# Patient Record
Sex: FEMALE | Race: WHITE
[De-identification: names, ages, dates, MRNs, and addresses within clinical notes are randomized per-mention and may not be internally consistent; named-entity substitution may affect disease eponyms.]

---

## 2017-02-10 ENCOUNTER — HOSPITAL ENCOUNTER (EMERGENCY)
Dept: HOSPITAL 39 - ER | Age: 37
Discharge: HOME | End: 2017-02-10
Payer: COMMERCIAL

## 2017-02-10 VITALS — OXYGEN SATURATION: 95 % | DIASTOLIC BLOOD PRESSURE: 87 MMHG | TEMPERATURE: 99 F | SYSTOLIC BLOOD PRESSURE: 142 MMHG

## 2017-02-10 DIAGNOSIS — Z86.14: ICD-10-CM

## 2017-02-10 DIAGNOSIS — Z87.891: ICD-10-CM

## 2017-02-10 DIAGNOSIS — J45.909: ICD-10-CM

## 2017-02-10 DIAGNOSIS — H66.90: Primary | ICD-10-CM

## 2017-02-10 NOTE — ED.PDOC
History of Present Illness





- General


Chief Complaint: Headache


Stated Complaint: Right sided headache & earache


Time Seen by Provider: 02/10/17 10:33


Source: patient, RN notes reviewed, Vital Signs reviewed


Exam Limitations: no limitations





- History of Present Illness


Initial Comments: 


Patient reports this morning at 2am she developed a right sided HA with ear pain

, dizziness and nausea. She had a similar, brief episode 5 days ago that 

resolved on its own. Denies any recent URI symptoms. No numbness, weakness, 

tingling. No visual or speech changes.


Timing/Duration: 24 hours


Quality: moderate, constant, pressure


Head Injury Location: other - Whole right side of head and jaw


Recent Head Trauma: no recent headache/trauma


Improving Factors: rest


Worsening Factors: movement


Associated Symptoms: nausea/vomiting, other - Dizziness/vertigo


Allergies/Adverse Reactions: 


Allergies





NO KNOWN ALLERGY Allergy (Unverified 07/19/15 22:17)


 








Home Medications: 


Ambulatory Orders





Fluoxetine HCl [Prozac] 60 mg PO DAILY 01/27/14 


Phenytoin Sodium Cap [Dilantin Cap] 100 mg PO TID 01/27/14 


Ondansetron [Zofran Odt] 4 mg PO Q4HR PRN #20 tab 02/10/17 











Review of Systems





- Review of Systems


Constitutional: Denies: chills, diaphoresis, fever, malaise, weakness


EENTM: States: see HPI, ear pain.  Denies: eye pain, blurred vision, double 

vision, ear discharge, nose pain, nose congestion, throat pain, throat swelling

, mouth pain, mouth swelling


Respiratory: States: no symptoms reported.  Denies: cough, short of breath


Cardiology: States: no symptoms reported.  Denies: chest pain, palpitations, 

syncope


Gastrointestinal/Abdominal: States: see HPI, nausea.  Denies: abdominal pain, 

diarrhea, vomiting


Musculoskeletal: States: no symptoms reported.  Denies: back pain, muscle pain, 

muscle stiffness


Skin: States: no symptoms reported


Neurological: States: headache.  Denies: numbness, paresthesia, pre-existing 

deficit, seizure, tingling, tremors, weakness


Endocrine: States: no symptoms reported


Hematologic/Lymphatic: States: no symptoms reported





Past Medical History (General)





- Patient Medical History


Hx Seizures: Yes


Hx Stroke: No


Hx Dementia: No


Hx Asthma: Yes


Hx of COPD: No


Hx Cardiac Disorders: No


Hx Congestive Heart Failure: No


Hx Pacemaker: No


Hx Hypertension: No


Hx Thyroid Disease: No


Hx Diabetes: No


Hx Gastroesophageal Reflux: No


Hx Renal Disease: No


Hx Cancer: No


Hx of HIV: No


Hx Hepatitis C: No


Hx MRSA: Yes


MRSA Source:: Wound





- Vaccination History


Hx Tetanus, Diphtheria Vaccination: No


Hx Influenza Vaccination: No


Hx Pneumococcal Vaccination: No





- Social History


Hx Tobacco Use: Yes


Hx Chewing Tobacco Use: No


Hx Alcohol Use: Yes


Hx Substance Use: No


Hx Substance Use Treatment: No


Hx Depression: Yes


Hx Physical Abuse: No


Hx Emotional Abuse: No


Hx Suspected Abuse: No





- Female History


Hx Last Menstrual Period: 07/01/14


Patient Pregnant: No





Family Medical History





- Family History


  ** Mother


Family History: Unknown





Physical Exam





- Physical Exam


General Appearance: Alert, No apparent distress, Ill Appearing, Unkempt, Well 

Developed, Well Hydrated, Well Nourished


Eyes, Ears, Nose, Throat Exam: PERRL/EOMI, pharynx normal, TM abnormal (R) - TM 

is erythematous and bulging


Neck: non-tender, full range of motion, supple, normal inspection, trachea 

midline


Cardiovascular/Chest: regular rate, rhythm, no edema, no gallop, no JVD, no 

murmur


Respiratory: lungs clear, normal breath sounds, no respiratory distress, no 

accessory muscle use


Extremity: normal range of motion, non-tender, normal inspection, no pedal edema


Mental Status: alert, oriented x 3


CNs Exam: normal hearing, normal speech, PERRL


Coordination/Gait: normal gait


Motor/Sensory: no motor deficit, no sensory deficit


Skin Exam: warm/dry, normal color


Lymphatic: no adenopathy





Progress





- Progress


Progress: 





02/10/17 11:35


Patient is feeling better but the pain keeps coming in waves. Discussed ear 

infection, how painful they can be, how they cause vertigo and the importance 

of gargling warm salt water and taking a good decongestant. Explained that the 

shot of Rochephin should cover the infection but need to get the ear draining 

with above measures. Also discussed the possibility of an ear drum rupture and 

the importance of following up for recheck if that dose occur.





Departure





- Departure


Clinical Impression: 


 Otitis media


Time of Disposition: 11:37


Disposition: Discharge to Home or Self Care


Condition: Good


Departure Forms:  ED Discharge - Pt. Copy, Patient Portal Self Enrollment


Instructions:  Middle Ear Infection


Diet: resume usual diet


Activity: increase activity as tolerated


Prescriptions: 


Ondansetron [Zofran Odt] 4 mg PO Q4HR PRN #20 tab


 PRN Reason: Nausea/Vomiting


Home Medications: 


Ambulatory Orders





Fluoxetine HCl [Prozac] 60 mg PO DAILY 01/27/14 


Phenytoin Sodium Cap [Dilantin Cap] 100 mg PO TID 01/27/14 


Ondansetron [Zofran Odt] 4 mg PO Q4HR PRN #20 tab 02/10/17 








Additional Instructions: 


Gargle warm salt water every 2 hours


OTC Robitussin or Mucinex as directed.

## 2017-03-07 ENCOUNTER — HOSPITAL ENCOUNTER (EMERGENCY)
Dept: HOSPITAL 39 - ER | Age: 37
Discharge: HOME | End: 2017-03-07
Payer: COMMERCIAL

## 2017-03-07 VITALS — DIASTOLIC BLOOD PRESSURE: 80 MMHG | TEMPERATURE: 98 F | SYSTOLIC BLOOD PRESSURE: 148 MMHG | OXYGEN SATURATION: 95 %

## 2017-03-07 DIAGNOSIS — J01.90: Primary | ICD-10-CM

## 2017-03-07 DIAGNOSIS — Z79.899: ICD-10-CM

## 2017-03-07 DIAGNOSIS — Z91.018: ICD-10-CM

## 2017-03-07 DIAGNOSIS — J98.9: ICD-10-CM

## 2017-03-07 NOTE — ED.PDOC
History of Present Illness





- General


Chief Complaint: General


Stated Complaint: (R) ear discomfort, fever, headache


Time Seen by Provider: 17 12:04


Source: patient


Exam Limitations: no limitations





- History of Present Illness


Initial Comments: 


She stated she started having cough non productive and nasal congestion for 6 

days,her daughter got ill first,no foreign travel no fever no chills.With dull 

frontal headache.


Timing/Duration: other - 6 days


Improving Factors: nothing


Worsening Factors: nothing


Associated Symptoms: cough, headaches, loss of appetite


Allergies/Adverse Reactions: 


Allergies





Coconut Fatty Acids Allergy (Verified 17 11:39)


 Anaphylaxis


 Causes throat and tongue to swell 








Home Medications: 


Ambulatory Orders





Fluoxetine HCl [Prozac] 60 mg PO DAILY 14 


Phenytoin Sodium Cap [Dilantin Cap] 100 mg PO TID 14 


Albuterol Inhaler [Ventolin Hfa Inhaler] 108 mcg IN Q4HR PRN #1 inh 17 


Amoxicillin [Amoxil] 1,000 mg PO BID #40 cap 17 


Benzonatate Perles [Tessalon Perles] 200 mg PO BID #30 cap 17 


predniSONE [Prednisone] 10 mg PO BID #14 tab 17 











Review of Systems





- Review of Systems


Constitutional: States: no symptoms reported


EENTM: States: nose congestion


Respiratory: States: cough


Cardiology: States: no symptoms reported


Gastrointestinal/Abdominal: States: no symptoms reported


Genitourinary: States: no symptoms reported


Musculoskeletal: States: no symptoms reported


Skin: States: no symptoms reported


Neurological: States: seizure - non compliant with medications


Endocrine: States: no symptoms reported


Hematologic/Lymphatic: States: no symptoms reported





Past Medical History (General)





- Patient Medical History


Hx Seizures: Yes


Hx Stroke: No


Hx Dementia: No


Hx Asthma: Yes


Hx of COPD: No


Hx Cardiac Disorders: No


Hx Congestive Heart Failure: No


Hx Pacemaker: No


Hx Hypertension: No


Hx Thyroid Disease: No


Hx Diabetes: No


Hx Gastroesophageal Reflux: No


Hx Renal Disease: No


Hx Cancer: No


Hx of HIV: No


Hx Hepatitis C: No


Hx MRSA: Yes


MRSA Source:: Wound





- Vaccination History


Hx Tetanus, Diphtheria Vaccination: No


Hx Influenza Vaccination: No


Hx Pneumococcal Vaccination: No





- Social History


Hx Tobacco Use: Yes


Hx Chewing Tobacco Use: No


Hx Alcohol Use: Yes


Hx Substance Use: No


Hx Substance Use Treatment: No


Hx Depression: Yes


Hx Physical Abuse: No


Hx Emotional Abuse: No


Hx Suspected Abuse: No





- Female History


Patient is a Female of Child Bearing Age (10 -59 yrs old): Yes


Hx Last Menstrual Period: 14


Patient Pregnant: No





Family Medical History





- Family History


  ** Mother


Family History: Unknown


Living Status: 


Hx Family Hypertension: Yes


Hx Family Stroke: Yes


Hx Family Diabetes: Yes


Hx Family Cancer: Yes - lung,breast-grandma





Physical Exam





- Physical Exam


General Appearance: Alert, Comfortable, No apparent distress


Eye Exam: bilateral normal


Ears, Nose, Throat: hearing grossly normal, normal ENT inspection, normal 

pharynx, nasal congestion


Neck: non-tender, full range of motion, supple


Respiratory: chest non-tender, lungs clear, normal breath sounds, no 

respiratory distress


Cardiovascular/Chest: normal peripheral pulses, regular rate, rhythm, no edema, 

no gallop, no JVD, no murmur


Gastrointestinal/Abdominal: normal bowel sounds, non tender, soft, no 

organomegaly


Back Exam: normal inspection, no CVA tenderness, no vertebral tenderness


Extremity: normal range of motion, non-tender, normal inspection


Neurologic: no motor/sensory deficits, alert, normal mood/affect, oriented x 3


Skin Exam: normal color, warm/dry





Progress





- EKG/XRAY/CT


XRAY: chest - no acute findings





Departure





- Departure


Clinical Impression: 


 Reactive airway disease that is not asthma





Acute sinusitis, unspecified


Qualifiers:


 Sinusitis location: unspecified location Recurrence: not specified Qualifier 

Code: (J01.90) Acute sinusitis, unspecified


Time of Disposition: 13:24


Disposition: Discharge to Home or Self Care


Condition: Good


Departure Forms:  ED Discharge - Pt. Copy, Patient Portal Self Enrollment


Instructions:  How to Quit Smoking, Reasons to Quit Smoking, All Forms of 

Smoking Are Bad for You, Tips to Help You Stop Smoking


Prescriptions: 


Albuterol Inhaler [Ventolin Hfa Inhaler] 108 mcg IN Q4HR PRN #1 inh


 PRN Reason: Cough


Amoxicillin [Amoxil] 1,000 mg PO BID #40 cap


predniSONE [Prednisone] 10 mg PO BID #14 tab


Benzonatate Perles [Tessalon Perles] 200 mg PO BID #30 cap


Home Medications: 


Ambulatory Orders





Fluoxetine HCl [Prozac] 60 mg PO DAILY 14 


Phenytoin Sodium Cap [Dilantin Cap] 100 mg PO TID 14 


Albuterol Inhaler [Ventolin Hfa Inhaler] 108 mcg IN Q4HR PRN #1 inh 17 


Amoxicillin [Amoxil] 1,000 mg PO BID #40 cap 17 


Benzonatate Perles [Tessalon Perles] 200 mg PO BID #30 cap 17 


predniSONE [Prednisone] 10 mg PO BID #14 tab 17 








Additional Instructions: 


NEED TO FOOLLOW UP WITH PRIMARY MD FOR REFILL OF ANTEPILEPSY MEDICATIONS

## 2017-03-07 NOTE — RAD
EXAM DESCRIPTION: 



Chest,1 View



CLINICAL HISTORY: 



cough



COMPARISON: 



January 27, 2014



IMPRESSION: 



Single AP portable upright view of the chest shows cardiac

silhouette and pulmonary vasculature to be within normal limits.

Lungs are normally aerated and clear.

No obvious pleural effusion or pneumothorax is seen.



Electronically signed by:  Erik Schwarz MD  3/7/2017 12:59 PM CST

## 2018-02-06 ENCOUNTER — HOSPITAL ENCOUNTER (EMERGENCY)
Dept: HOSPITAL 39 - ER | Age: 38
Discharge: HOME | End: 2018-02-06
Payer: SELF-PAY

## 2018-02-06 VITALS — SYSTOLIC BLOOD PRESSURE: 119 MMHG | DIASTOLIC BLOOD PRESSURE: 88 MMHG | TEMPERATURE: 100.9 F | OXYGEN SATURATION: 96 %

## 2018-02-06 DIAGNOSIS — J06.9: Primary | ICD-10-CM

## 2018-02-06 DIAGNOSIS — K11.20: ICD-10-CM

## 2018-02-06 DIAGNOSIS — Z87.891: ICD-10-CM

## 2018-02-06 DIAGNOSIS — B00.1: ICD-10-CM

## 2018-02-06 NOTE — ED.PDOC
History of Present Illness





- General


Chief Complaint: Fever


Stated Complaint: fever, fever blisters


Time Seen by Provider: 18 06:22


Exam Limitations: no limitations





- History of Present Illness


Initial Comments: 





the patient is a 37-year-old  female presenting to the emergency room 

secondary to intermittent fevers for the last couple of weeks as well as a 

cough for the last few days.  The patient has had a significant fever blister 

develop over the last 24-48 hrs. with significant surrounding erythema and also 

what is likely reactive lymphadenopathy below her right jawline.  There is the 

possibility this may be a swollen submandibular gland.  no parotid gland 

swelling.


Timing/Duration: unsure


Severity: moderate


Improving Factors: nothing


Worsening Factors: nothing


Associated Symptoms: cough, fever/chills, loss of appetite, malaise


Allergies/Adverse Reactions: 


Allergies





Coconut Fatty Acids Allergy (Verified 17 11:39)


 Anaphylaxis


 Causes throat and tongue to swell 








Home Medications: 


Ambulatory Orders





Fluoxetine HCl [Prozac] 60 mg PO DAILY 14 


Phenytoin Sodium Cap Extended [Dilantin Cap] 100 mg PO TID 14 


Albuterol Inhaler [Ventolin Hfa Inhaler] 108 mcg IN Q4HR PRN #1 inh 17 


Amoxicillin [Amoxil] 1,000 mg PO BID #40 cap 17 


Benzonatate Perles [Tessalon Perles] 200 mg PO BID #30 cap 17 


predniSONE [Prednisone] 10 mg PO BID #14 tab 17 


Amoxicillin & Pot Clavulanate [Augmentin Tab] 875 mg PO BID #20 tab 18 


Docosanol [Abreva] 10 % TOP TID #1 tube 18 


Valacyclovir HCl [Valtrex] 2 gm PO BID #6 tab 18 











Review of Systems





- Review of Systems


Constitutional: States: fever, malaise


EENTM: States: see HPI, nose congestion


Respiratory: States: cough


Cardiology: States: no symptoms reported


Gastrointestinal/Abdominal: States: no symptoms reported


Genitourinary: States: no symptoms reported


Musculoskeletal: States: no symptoms reported - with the exception of 

generalized body aches


Skin: States: no symptoms reported


Neurological: States: no symptoms reported, headache


Endocrine: States: no symptoms reported


All other Systems: No Change from Baseline





Past Medical History (General)





- Patient Medical History


Hx Seizures: Yes


Hx Stroke: No


Hx Dementia: No


Hx Asthma: Yes


Hx of COPD: No


Hx Cardiac Disorders: No


Hx Congestive Heart Failure: No


Hx Pacemaker: No


Hx Hypertension: No


Hx Thyroid Disease: No


Hx Diabetes: No


Hx Gastroesophageal Reflux: No


Hx Renal Disease: No


Hx Cancer: No


Hx of HIV: No


Hx Hepatitis C: No


Hx MRSA: Yes


MRSA Source:: Wound





- Vaccination History


Hx Tetanus, Diphtheria Vaccination: No


Hx Influenza Vaccination: No


Hx Pneumococcal Vaccination: No





- Social History


Hx Tobacco Use: Yes


Hx Chewing Tobacco Use: No


Hx Alcohol Use: Yes


Hx Substance Use: No


Hx Substance Use Treatment: No


Hx Depression: Yes


Hx Physical Abuse: No


Hx Emotional Abuse: No


Hx Suspected Abuse: No





- Female History


Hx Last Menstrual Period: 14


Patient Pregnant: No





Family Medical History





- Family History


  ** Mother


Family History: Unknown


Living Status: 


Hx Family Hypertension: Yes


Hx Family Stroke: Yes


Hx Family Diabetes: Yes


Hx Family Cancer: Yes - lung,breast-grandma





Physical Exam





- Physical Exam


General Appearance: Alert, No apparent distress


Eye Exam: bilateral normal


Ears, Nose, Throat: hearing grossly normal, nasal congestion, pharyngeal 

erythema, other - fever blister to her upper right lip with significant 

surrounding erythema


Neck: other - significant lymphadenopathy versus submandibular swelling under 

the right jaw line.  Tender to palpation.


Respiratory: lungs clear, normal breath sounds, no respiratory distress, no 

accessory muscle use


Cardiovascular/Chest: normal peripheral pulses, no edema


Peripheral Pulses: radial,right: 2+, radial,left: 2+, dorsalis pedis,right: 2+, 

dorsalis pedis,left: 2+


Gastrointestinal/Abdominal: normal bowel sounds, non tender, soft


Rectal Exam: deferred


Back Exam: normal inspection, no CVA tenderness


Extremity: normal range of motion, non-tender, normal inspection, no pedal edema

, normal capillary refill


Neurologic: CNs II-XII nml as tested, alert, normal mood/affect, oriented x 3


Skin Exam: normal color - ee history of present illness


Comments: 





 Vital Signs - 24 hr











  18





  06:10


 


Temperature 100.9 F H


 


Pulse Rate [ 111 H





monitor] 


 


Respiratory 16





Rate 


 


Blood Pressure 119/88





[Left Arm] 


 


O2 Sat by Pulse 96





Oximetry 














Progress





- Progress


Progress: 





18 06:31


the patient is a 37-year-old with an obvious oral HSV infection currently 

flaring.  She will be placed on Valtrex.  Additionally the patient is going to 

be covered for the possibility of overlying bacterial infection, possibly of 

the submandibular gland, with Augmentin twice daily for 7 days.  first doses of 

each are given here tonight.  She needs to follow up with her primary care 

doctor in 3 days for reevaluation.  She obviously needs to return here for any 

significant worsening.  She needs to really push her hydration status.  sucking 

on hard candy and chewing on things like beef jerky can help stimulate saliva 

production encase this is a submandibular gland infection.  She needs to use 

Motrin and Tylenol as needed to keep her fever control as this may help reduce 

her symptoms from the HSV very significantly.  She can use topical Abreva if 

she wishes on the fever blisters as well.


18 06:36








Departure





- Departure


Clinical Impression: 


 Recurrent herpes labialis, Sialadenitis





Upper respiratory infection


Qualifiers:


 URI type: unspecified URI Qualified Code(s): J06.9 - Acute upper respiratory 

infection, unspecified





Disposition: Discharge to Home or Self Care


Condition: Fair


Departure Forms:  ED Discharge - Pt. Copy, Patient Portal Self Enrollment


Instructions:  DI for Cold Sores


Diet: regular diet


Activity: increase activity as tolerated


Referrals: 


Genevieve Baugh NP [Primary Care Provider] - 1-5 Days


Prescriptions: 


Amoxicillin & Pot Clavulanate [Augmentin Tab] 875 mg PO BID #20 tab


Docosanol [Abreva] 10 % TOP TID #1 tube


Valacyclovir HCl [Valtrex] 2 gm PO BID #6 tab


Home Medications: 


Ambulatory Orders





Fluoxetine HCl [Prozac] 60 mg PO DAILY 14 


Phenytoin Sodium Cap Extended [Dilantin Cap] 100 mg PO TID 14 


Albuterol Inhaler [Ventolin Hfa Inhaler] 108 mcg IN Q4HR PRN #1 inh 17 


Amoxicillin [Amoxil] 1,000 mg PO BID #40 cap 17 


Benzonatate Perles [Tessalon Perles] 200 mg PO BID #30 cap 17 


predniSONE [Prednisone] 10 mg PO BID #14 tab 17 


Amoxicillin & Pot Clavulanate [Augmentin Tab] 875 mg PO BID #20 tab 18 


Docosanol [Abreva] 10 % TOP TID #1 tube 18 


Valacyclovir HCl [Valtrex] 2 gm PO BID #6 tab 18 








Additional Instructions: 


the patient is a 37-year-old with an obvious oral HSV infection currently 

flaring.  She will be placed on Valtrex.  Additionally the patient is going to 

be covered for the possibility of overlying bacterial infection, possibly of 

the submandibular gland, with Augmentin twice daily for 7 days.  first doses of 

each are given here tonight.  She needs to follow up with her primary care 

doctor in 3 days for reevaluation.  She obviously needs to return here for any 

significant worsening.  She needs to really push her hydration status.  sucking 

on hard candy and chewing on things like beef jerky can help stimulate saliva 

production encase this is a submandibular gland infection.  She needs to use 

Motrin and Tylenol as needed to keep her fever control as this may help reduce 

her symptoms from the HSV very significantly.  She can use topical Abreva if 

she wishes on the fever blisters as well.

## 2018-06-15 ENCOUNTER — HOSPITAL ENCOUNTER (EMERGENCY)
Dept: HOSPITAL 39 - ER | Age: 38
Discharge: HOME | End: 2018-06-15
Payer: SELF-PAY

## 2018-06-15 VITALS — OXYGEN SATURATION: 96 % | SYSTOLIC BLOOD PRESSURE: 176 MMHG | DIASTOLIC BLOOD PRESSURE: 115 MMHG

## 2018-06-15 VITALS — TEMPERATURE: 99.3 F

## 2018-06-15 DIAGNOSIS — F17.200: ICD-10-CM

## 2018-06-15 DIAGNOSIS — K12.2: Primary | ICD-10-CM

## 2018-06-15 DIAGNOSIS — Z79.899: ICD-10-CM

## 2018-06-15 DIAGNOSIS — Z86.14: ICD-10-CM

## 2018-06-15 DIAGNOSIS — F32.9: ICD-10-CM

## 2018-06-15 DIAGNOSIS — R56.9: ICD-10-CM

## 2018-06-15 DIAGNOSIS — J45.909: ICD-10-CM

## 2018-06-15 NOTE — ED.PDOC
History of Present Illness





- General


Chief Complaint: Dental/Mouth


Time Seen by Provider: 06/15/18 09:11


Source: patient


Exam Limitations: no limitations





- History of Present Illness


Initial Comments: 





the patient is a 37-year-old  female presenting to the emergency room 

secondary to pain and nodule formation to the roof of her mouth that started 

about 3 days ago.  She reports low-grade fever at home.  She has been having 

some difficulty eating secondary to pain there.  She has had some nausea.  No 

fever blisters or oral ulcers otherwise.  She does have very poor 

dentition.physical exam shows a nodule on the roof of her mouth slightly left 

of center that has been obviously draining a small amount of pus.  It is 

approximately the size of a nickel.  Examination down the nares show no 

extension into the nasal channels.


Timing/Duration: unsure


Severity: severe


Improving Factors: nothing


Worsening Factors: eating


Associated Symptoms: denies symptoms


Allergies/Adverse Reactions: 


Allergies





Coconut Fatty Acids Allergy (Verified 17 11:39)


 Anaphylaxis


 Causes throat and tongue to swell 








Home Medications: 


Ambulatory Orders





Fluoxetine HCl [Prozac] 60 mg PO DAILY 14 


Phenytoin Sodium Cap Extended [Dilantin Cap] 100 mg PO TID 14 


Albuterol Inhaler [Ventolin Hfa Inhaler] 108 mcg IN Q4HR PRN #1 inh 17 


Amoxicillin [Amoxil] 1,000 mg PO BID #40 cap 17 


Benzonatate Perles [Tessalon Perles] 200 mg PO BID #30 cap 17 


predniSONE [Prednisone] 10 mg PO BID #14 tab 17 


Amoxicillin & Pot Clavulanate [Augmentin Tab] 875 mg PO BID #20 tab 18 


Docosanol [Abreva] 10 % TOP TID #1 tube 18 


Valacyclovir HCl [Valtrex] 2 gm PO BID #6 tab 18 


Clindamycin HCl 300 mg PO Q8H #20 cap 06/15/18 


Tramadol HCl 50 mg PO Q6HRS PRN #30 tab 06/15/18 











Review of Systems





- Review of Systems


Constitutional: States: fever, malaise


EENTM: States: no symptoms reported


Respiratory: States: no symptoms reported


Cardiology: States: no symptoms reported


Gastrointestinal/Abdominal: States: nausea.  Denies: constipation, diarrhea, 

vomiting


Genitourinary: States: no symptoms reported


Musculoskeletal: States: no symptoms reported


Skin: States: no symptoms reported


Neurological: States: headache


Endocrine: States: no symptoms reported


All other Systems: No Change from Baseline





Past Medical History (General)





- Patient Medical History


Hx Seizures: Yes


Hx Stroke: No


Hx Dementia: No


Hx Asthma: Yes


Hx of COPD: No


Hx Cardiac Disorders: No


Hx Congestive Heart Failure: No


Hx Pacemaker: No


Hx Hypertension: No


Hx Thyroid Disease: No


Hx Diabetes: No


Hx Gastroesophageal Reflux: No


Hx Renal Disease: No


Hx Cancer: No


Hx of HIV: No


Hx Hepatitis C: No


Hx MRSA: Yes


MRSA Source:: Wound


Surgical History: other





- Vaccination History


Hx Tetanus, Diphtheria Vaccination: No


Hx Influenza Vaccination: No


Hx Pneumococcal Vaccination: No





- Social History


Hx Tobacco Use: Yes


Hx Chewing Tobacco Use: No


Hx Alcohol Use: Yes


Hx Substance Use: No


Hx Substance Use Treatment: No


Hx Depression: Yes


Hx Physical Abuse: No


Hx Emotional Abuse: No


Hx Suspected Abuse: No





- Female History


Hx Last Menstrual Period: 14


Patient Pregnant: No





Family Medical History





- Family History


  ** Mother


Family History: Unknown


Living Status: 


Hx Family Hypertension: Yes


Hx Family Stroke: Yes


Hx Family Diabetes: Yes


Hx Family Cancer: Yes - lung,breast-grandma





Physical Exam





- Physical Exam


General Appearance: Alert, Anxious


Eye Exam: bilateral normal


Ears, Nose, Throat: hearing grossly normal, other - see history of present 

illness


Neck: full range of motion, supple


Respiratory: lungs clear, normal breath sounds, no respiratory distress, no 

accessory muscle use


Cardiovascular/Chest: normal peripheral pulses, regular rate, rhythm, no edema


Peripheral Pulses: radial,right: 2+, radial,left: 2+, dorsalis pedis,right: 2+, 

dorsalis pedis,left: 2+


Gastrointestinal/Abdominal: non tender, soft


Rectal Exam: deferred


Back Exam: normal inspection, no CVA tenderness


Extremity: non-tender, no pedal edema, no calf tenderness, normal capillary 

refill


Neurologic: CNs II-XII nml as tested, alert, oriented x 3


Skin Exam: normal color


Comments: 





 Vital Signs - 8 hr











  06/15/18





  09:12


 


Temperature 99.3 F


 


Pulse Rate [ 71





left radial] 


 


Respiratory 16





Rate 


 


Blood Pressure 182/136





[left radial] 


 


O2 Sat by Pulse 95





Oximetry 














Progress





- Progress


Progress: 





06/15/18 09:38


the patient is a 37-year-old  female presenting with a small abscess 

to the roof of her mouth.  After risk and benefits were explained I&D was 

performed with a #11 blade scalpel making a 1 cm incision into the abscess.  

Proximally 1-2 cc of pus were drained.  The patient tolerated this well and is 

actually feeling a little better after.  The patient is going to be placed on 

clindamycin 300 mg 3 times daily for the next 7 days as empiric treatment and 

is going to be written for some tramadol for as needed use for the pain 

control.  She needs to keep herself well hydrated and maintain primarily a 

liquid diet for the next day or 2.  she needs to mouthwash with Listerine 5 or 

6 times daily for the next couple of weeks. ER warnings were given for any 

significant worsening.  Blood pressure is elevated here but likely related to 

pain.  This also needs to be followed up with her primary care doctor as an 

outpatient.





Departure





- Departure


Clinical Impression: 


 Abscess of oral tissue





Disposition: Discharge to Home or Self Care


Condition: Fair


Departure Forms:  ED Discharge - Pt. Copy, Patient Portal Self Enrollment


Diet: regular diet


Activity: increase activity as tolerated


Referrals: 


Genevieve Baugh NP [Primary Care Provider] - 1-5 Days


Prescriptions: 


Tramadol HCl 50 mg PO Q6HRS PRN #30 tab


 PRN Reason: Moderate To Severe Pain


Clindamycin HCl 300 mg PO Q8H #20 cap


Home Medications: 


Ambulatory Orders





Fluoxetine HCl [Prozac] 60 mg PO DAILY 14 


Phenytoin Sodium Cap Extended [Dilantin Cap] 100 mg PO TID 14 


Albuterol Inhaler [Ventolin Hfa Inhaler] 108 mcg IN Q4HR PRN #1 inh 17 


Amoxicillin [Amoxil] 1,000 mg PO BID #40 cap 17 


Benzonatate Perles [Tessalon Perles] 200 mg PO BID #30 cap 17 


predniSONE [Prednisone] 10 mg PO BID #14 tab 17 


Amoxicillin & Pot Clavulanate [Augmentin Tab] 875 mg PO BID #20 tab 18 


Docosanol [Abreva] 10 % TOP TID #1 tube 18 


Valacyclovir HCl [Valtrex] 2 gm PO BID #6 tab 18 


Clindamycin HCl 300 mg PO Q8H #20 cap 06/15/18 


Tramadol HCl 50 mg PO Q6HRS PRN #30 tab 06/15/18 








Additional Instructions: 


the patient is a 37-year-old  female presenting with a small abscess 

to the roof of her mouth.  After risk and benefits were explained I&D was 

performed with a #11 blade scalpel making a 1 cm incision into the abscess.  

Proximally 1-2 cc of pus were drained.  The patient tolerated this well and is 

actually feeling a little better after.  The patient is going to be placed on 

clindamycin 300 mg 3 times daily for the next 7 days as empiric treatment and 

is going to be written for some tramadol for as needed use for the pain 

control.  She needs to keep herself well hydrated and maintain primarily a 

liquid diet for the next day or 2.  she needs to mouthwash with Listerine 5 or 

6 times daily for the next couple of weeks. ER warnings were given for any 

significant worsening.  Blood pressure is elevated here but likely related to 

pain.  This also needs to be followed up with her primary care doctor as an 

outpatient.  Motrin or Aleve can also help with discomfort however it does need 

to be noted that these can also slightly worsened some high blood pressure in 

the short-term.

## 2019-02-07 ENCOUNTER — HOSPITAL ENCOUNTER (EMERGENCY)
Dept: HOSPITAL 39 - ER | Age: 39
Discharge: HOME | End: 2019-02-07
Payer: SELF-PAY

## 2019-02-07 VITALS — DIASTOLIC BLOOD PRESSURE: 96 MMHG | SYSTOLIC BLOOD PRESSURE: 174 MMHG | OXYGEN SATURATION: 95 %

## 2019-02-07 VITALS — TEMPERATURE: 98.1 F
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
## 2019-02-07 DIAGNOSIS — F32.9: ICD-10-CM

## 2019-02-07 DIAGNOSIS — S61.052A: Primary | ICD-10-CM

## 2019-02-07 DIAGNOSIS — Z23: ICD-10-CM

## 2019-02-07 DIAGNOSIS — Z87.891: ICD-10-CM

## 2019-02-07 DIAGNOSIS — J45.909: ICD-10-CM

## 2019-02-07 RX ADMIN — TETANUS TOXOID, REDUCED DIPHTHERIA TOXOID AND ACELLULAR PERTUSSIS VACCINE, ADSORBED ONE EA: 5; 2.5; 8; 8; 2.5 SUSPENSION INTRAMUSCULAR at 18:30

## 2019-02-07 RX ADMIN — AMOXICILLIN AND CLAVULANATE POTASSIUM ONE MG: 875; 125 TABLET, FILM COATED ORAL at 18:30

## 2019-02-07 NOTE — ED.PDOC
History of Present Illness





- General


Chief Complaint: Bite: Animal/Insect/Human


Stated Complaint: Dog bite to L thumb


Time Seen by Provider: 19 17:55


Source: patient


Exam Limitations: no limitations





- History of Present Illness


Initial Comments: 





the patient's 38-year-old female presenting to the emergency room secondary to a

dog bite to her left thumb.  She was breaking up a dog fight between her dogs.  

The smaller dog bit her thumb and she has a one half synovator laceration to the

lateral and medial aspect of the left thumb at the level of the interphalangeal 

joint.  She does have some very mild surrounding numbness but no numbness at the

tip of the finger.  Mechanical function is preserved.  Capillary refill is 

within normal limits.  No other significant lacerations.  It has been more than 

5 years since her last tetanus shot.  The wounds were hemostatic at this point. 

She reports that her dogs have had their shots.  This bite has been reported.


Timing/Duration: momentarily


Severity: mild


Improving Factors: nothing


Worsening Factors: nothing


Associated Symptoms: denies symptoms


Allergies/Adverse Reactions: 


Allergies





Coconut Fatty Acids Allergy (Verified 19 18:13)


   Anaphylaxis


   Causes throat and tongue to swell 








Home Medications: 


Ambulatory Orders





Fluoxetine HCl [Prozac] 60 mg PO DAILY 14 


Phenytoin Sodium Cap Extended [Dilantin Cap] 100 mg PO TID 14 


Albuterol Inhaler [Ventolin Hfa Inhaler] 108 mcg IN Q4HR PRN #1 inh 17 


Amoxicillin & Pot Clavulanate [Augmentin Tab] 875 mg PO BID #10 tab 19 











Review of Systems





- Review of Systems


Constitutional: States: no symptoms reported


EENTM: States: no symptoms reported


Respiratory: States: no symptoms reported


Cardiology: States: no symptoms reported


Gastrointestinal/Abdominal: States: no symptoms reported


Genitourinary: States: no symptoms reported


Musculoskeletal: States: no symptoms reported


Skin: States: see HPI


Neurological: States: no symptoms reported


Endocrine: States: no symptoms reported


All other Systems: No Change from Baseline





Past Medical History (General)





- Patient Medical History


Hx Seizures: Yes


Hx Stroke: No


Hx Dementia: No


Hx Asthma: Yes


Hx of COPD: No


Hx Cardiac Disorders: No


Hx Congestive Heart Failure: No


Hx Pacemaker: No


Hx Hypertension: No


Hx Thyroid Disease: No


Hx Diabetes: No


Hx Gastroesophageal Reflux: No


Hx Renal Disease: No


Hx Cancer: No


Hx of HIV: No


Hx Hepatitis C: No


Hx MRSA: Yes


MRSA Source:: Wound





- Vaccination History


Hx Tetanus, Diphtheria Vaccination: No


Hx Influenza Vaccination: No


Hx Pneumococcal Vaccination: No





- Social History


Hx Tobacco Use: Yes


Hx Chewing Tobacco Use: No


Hx Alcohol Use: Yes


Hx Substance Use: No


Hx Substance Use Treatment: No


Hx Depression: Yes


Hx Physical Abuse: No


Hx Emotional Abuse: No


Hx Suspected Abuse: No





- Female History


Patient is a Female of Child Bearing Age (10 -59 yrs old): Yes


Hx Last Menstrual Period: 14


Patient Pregnant: No





Family Medical History





- Family History


  ** Mother


Family History: Unknown


Living Status: 


Hx Family Hypertension: Yes


Hx Family Stroke: Yes


Hx Family Diabetes: Yes


Hx Family Cancer: Yes - lung,breast-grandma





Physical Exam





- Physical Exam


General Appearance: Alert, Comfortable, No apparent distress


Eye Exam: bilateral normal


Ears, Nose, Throat: hearing grossly normal


Neck: full range of motion


Respiratory: no respiratory distress, no accessory muscle use


Cardiovascular/Chest: normal peripheral pulses, no edema


Peripheral Pulses: radial,right: 2+, radial,left: 2+


Gastrointestinal/Abdominal: other - obese


Rectal Exam: deferred


Extremity: normal range of motion, normal capillary refill, other - see history 

of present illness


Neurologic: CNs II-XII nml as tested, alert, normal mood/affect, oriented x 3, 

other - see history of present illness


Skin Exam: normal color - lacerations as per history of present illness


Comments: 





                               Vital Signs - 24 hr











  19





  18:03


 


Temperature 98.1 F


 


Pulse Rate [ 105 H





Left Radial] 


 


Respiratory 20





Rate 


 


Blood Pressure 152/114





[Right Arm] 


 


O2 Sat by Pulse 94 L





Oximetry 














Progress





- Progress


Progress: 





19 18:30


the patient is a 38-year-old female presenting to the emergency room secondary 

to a dog bite to her thumb of her left hand.  2 lacerations are present.  

Lacerations have been irrigated with 5 minutes of running water.  The patient is

being dosed with Augmentin and will be placed on this twice daily for the next 5

days for infection prophylaxis.  She is also receiving a tetanus shot.  Wounds 

were Steri-Stripped closed.  She does need to keep these clean.  She does need 

to wear gloves when at work.  Strips will fall off on their own, do not pull 

them off.  Monitor for any evidence of infection.  ER warnings were given.  Keep

routine follow-up with primary care doctor.  Bite has been reported.  this is a 

low risk bite as the dogs are her own and she reports they have had their shots.





Departure





- Departure


Clinical Impression: 


 Bite wound





Disposition: Discharge to Home or Self Care


Condition: Fair


Departure Forms:  ED Discharge - Pt. Copy, Patient Portal Self Enrollment


Diet: regular diet


Activity: increase activity as tolerated


Referrals: 


Genevieve Baugh NP [Primary Care Provider] - 1-2 Weeks


Prescriptions: 


Amoxicillin & Pot Clavulanate [Augmentin Tab] 875 mg PO BID #10 tab


Home Medications: 


Ambulatory Orders





Fluoxetine HCl [Prozac] 60 mg PO DAILY 14 


Phenytoin Sodium Cap Extended [Dilantin Cap] 100 mg PO TID 14 


Albuterol Inhaler [Ventolin Hfa Inhaler] 108 mcg IN Q4HR PRN #1 inh 17 


Amoxicillin & Pot Clavulanate [Augmentin Tab] 875 mg PO BID #10 tab 19 








Additional Instructions: 


the patient is a 38-year-old female presenting to the emergency room secondary 

to a dog bite to her thumb of her left hand.  2 lacerations are present.  

Lacerations have been irrigated with 5 minutes of running water.  The patient is

being dosed with Augmentin and will be placed on this twice daily for the next 5

days for infection prophylaxis.  She is also receiving a tetanus shot.  Wounds 

were Steri-Stripped closed.  She does need to keep these clean.  She does need 

to wear gloves when at work.  Strips will fall off on their own, do not pull 

them off.  Monitor for any evidence of infection.  ER warnings were given.  Keep

routine follow-up with primary care doctor.  Bite has been reported.  this is a 

low risk bite as the dogs are her own and she reports they have had their shots.

## 2019-06-08 ENCOUNTER — HOSPITAL ENCOUNTER (EMERGENCY)
Dept: HOSPITAL 39 - ER | Age: 39
Discharge: HOME | End: 2019-06-08
Payer: SELF-PAY

## 2019-06-08 VITALS — OXYGEN SATURATION: 100 % | SYSTOLIC BLOOD PRESSURE: 157 MMHG | DIASTOLIC BLOOD PRESSURE: 98 MMHG | TEMPERATURE: 98.2 F

## 2019-06-08 DIAGNOSIS — J45.909: ICD-10-CM

## 2019-06-08 DIAGNOSIS — R56.9: ICD-10-CM

## 2019-06-08 DIAGNOSIS — Z87.891: ICD-10-CM

## 2019-06-08 DIAGNOSIS — F32.9: ICD-10-CM

## 2019-06-08 DIAGNOSIS — K12.2: Primary | ICD-10-CM

## 2019-06-08 DIAGNOSIS — Z86.14: ICD-10-CM

## 2019-06-08 NOTE — ED.PDOC
History of Present Illness





- General


Chief Complaint: Dental/Mouth


Stated Complaint: Abcess in the roof of mouth


Time Seen by Provider: 19 17:06


Source: patient


Exam Limitations: no limitations





- History of Present Illness


Initial Comments: 





the patient's a 38-year-old  female presenting to the emergency room 

secondary to abscess formation in the roof of her mouth.  She has had this 

problem in the past.  There is about a nickel-sized abscess to the anterior left

palate.  She reports this is been getting worse over the last month.  She does 

have very poor dentition.


Timing/Duration: constant


Severity: moderate


Improving Factors: nothing


Worsening Factors: nothing


Associated Symptoms: denies symptoms


Allergies/Adverse Reactions: 


Allergies





Coconut Fatty Acids Allergy (Verified 19 18:13)


   Anaphylaxis


   Causes throat and tongue to swell 


Bee Venom Adverse Reaction (Verified 19 17:26)


   





Home Medications: 


Ambulatory Orders





Albuterol Inhaler [Ventolin Hfa Inhaler] 108 mcg IN Q4HR PRN #1 inh 17 


Cephalexin Monohydrate [Keflex] 500 mg PO Q8H #30 cap 19 











Review of Systems





- Review of Systems


Constitutional: States: no symptoms reported


EENTM: States: see HPI


Respiratory: States: no symptoms reported


Cardiology: States: no symptoms reported


Gastrointestinal/Abdominal: States: no symptoms reported


Genitourinary: States: no symptoms reported


Musculoskeletal: States: no symptoms reported


Skin: States: no symptoms reported


Neurological: States: no symptoms reported


Endocrine: States: no symptoms reported


All other Systems: No Change from Baseline





Past Medical History (General)





- Patient Medical History


Hx Seizures: Yes


Hx Stroke: No


Hx Dementia: No


Hx Asthma: Yes


Hx of COPD: No


Hx Cardiac Disorders: No


Hx Congestive Heart Failure: No


Hx Pacemaker: No


Hx Hypertension: No


Hx Thyroid Disease: No


Hx Diabetes: No


Hx Gastroesophageal Reflux: No


Hx Renal Disease: No


Hx Cancer: No


Hx of HIV: No


Hx Hepatitis C: No


Hx MRSA: Yes


MRSA Source:: Wound


Surgical History: other





- Vaccination History


Hx Tetanus, Diphtheria Vaccination: Yes


Hx Influenza Vaccination: No


Hx Pneumococcal Vaccination: No





- Social History


Hx Tobacco Use: Yes


Hx Chewing Tobacco Use: No


Hx Alcohol Use: Yes


Hx Substance Use: No


Hx Substance Use Treatment: No


Hx Depression: Yes


Hx Physical Abuse: No


Hx Emotional Abuse: No


Hx Suspected Abuse: No





- Female History


Patient is a Female of Child Bearing Age (10 -59 yrs old): Yes


Hx Last Menstrual Period: 14


Patient Pregnant: No - Denies





Family Medical History





- Family History


  ** Mother


Family History: Unknown


Living Status: 


Hx Family Hypertension: Yes


Hx Family Stroke: Yes


Hx Family Diabetes: Yes


Hx Family Cancer: Yes - lung,breast-grandma





Physical Exam





- Physical Exam


General Appearance: Alert, Comfortable, No apparent distress


Eye Exam: bilateral normal


Ears, Nose, Throat: hearing grossly normal, other - see history of present 

illness


Neck: full range of motion, supple


Respiratory: no respiratory distress, no accessory muscle use


Cardiovascular/Chest: normal peripheral pulses, no edema, other - regular rate


Peripheral Pulses: radial,right: 2+, radial,left: 2+


Gastrointestinal/Abdominal: other - morbidly obese


Rectal Exam: deferred


Extremity: non-tender, normal inspection, no pedal edema, normal capillary r

efill


Neurologic: CNs II-XII nml as tested, alert, normal mood/affect, oriented x 3


Skin Exam: normal color


Comments: 





                               Vital Signs - 24 hr











  19





  17:18


 


Temperature 99.2 F


 


Pulse Rate [L 80





finger] 


 


Respiratory 18





Rate 


 


Blood Pressure 169/116





[Left Arm] 


 


O2 Sat by Pulse 99





Oximetry 














Progress





- Progress


Progress: 





19 17:37


procedure note: After risks and benefits were explained the patient did agree to

an I&D of the abscess.  Xylocaine without epinephrine 1 cc were used for local 

anesthetic.  A 15 blade scalpel was used to make a 1 cm incision over the roof 

of the abscess.  The drainage was obtained.  The patient refused to let me 

breakup any septations with a cotton-tip swab.  Drainage is approximately 2 cc. 

Estimated blood loss less than 1 cc.





The patient is a 38-year-old  female presenting to emergency room 

secondary to an abscess in the roof of her mouth.  I&D was performed.  The 

patient was given a dose of Rocephin here and will be placed on Keflex for the 

next week as an outpatient.  She does need to see a dentist for her very poor 

dentition.  ER warnings were given for any worsening.  Keep routine follow up 

with primary care doctor otherwise.





Departure





- Departure


Clinical Impression: 


 Abscess of oral space





Disposition: Discharge to Home or Self Care


Condition: Fair


Departure Forms:  ED Discharge - Pt. Copy, Patient Portal Self Enrollment


Diet: regular diet


Activity: increase activity as tolerated


Referrals: 


Genevieve Baugh, NP [Primary Care Provider] - 1-2 Weeks


Prescriptions: 


Cephalexin Monohydrate [Keflex] 500 mg PO Q8H #30 cap


Home Medications: 


Ambulatory Orders





Albuterol Inhaler [Ventolin Hfa Inhaler] 108 mcg IN Q4HR PRN #1 inh 17 


Cephalexin Monohydrate [Keflex] 500 mg PO Q8H #30 cap 19 








Additional Instructions: 


The patient is a 38-year-old  female presenting to emergency room 

secondary to an abscess in the roof of her mouth.  I&D was performed.  The 

patient was given a dose of Rocephin here and will be placed on Keflex for the 

next week as an outpatient.  She does need to see a dentist for her very poor 

dentition.  ER warnings were given for any worsening.  Keep routine follow up 

with primary care doctor otherwise.

## 2019-11-20 ENCOUNTER — HOSPITAL ENCOUNTER (OUTPATIENT)
Dept: HOSPITAL 39 - MAMMO | Age: 39
Discharge: HOME | End: 2019-11-20
Attending: FAMILY MEDICINE
Payer: COMMERCIAL

## 2019-11-20 DIAGNOSIS — N64.4: Primary | ICD-10-CM

## 2019-11-20 DIAGNOSIS — N64.52: ICD-10-CM

## 2019-11-21 NOTE — MAM
EXAM DESCRIPTION: 

Diagnostic Mammo,Bilateral: Digital Mammography



CLINICAL HISTORY: 

39 yearsFemaleLEFT BREAST PAIN AND LEFT BREAST NIPPLE DISCHARGE .

No personal history of breast cancer. Remote family history of

breast cancer. Menarche age 10. Childbirth age 19. Premenopausal.

No HRT. Prior left breast biopsy..  Lifetime risk of developing

breast cancer (Tyrer-Cuzick model) percentage is 10.6.



COMPARISON: 

2-D digital diagnostic bilateral mammography and diagnostic

breast ultrasound 30 July 2015..



TECHNIQUE: 

Bilateral LM, CC, and MLO projection full-field images,  digital

mammographic tomosynthesis technique.  Bilateral 2-D digital

full-field MLO images:.  LM, CC, and MLO projections. CAD not

utilized.  



FINDINGS: 

The breast parenchymal density pattern is: Heterogeneously dense

breast tissue, which may obscure small masses.  No skin

thickening or nipple retraction  small bilateral solitary

microcalcifications.

No new focal, stellate mass or density, focal asymmetry , and no

suspicious microcalcifications laterally. Stable mammograms

compared to prior study, taking into account differences in

mammographic technique



IMPRESSION: 

Benign exam.



BIRAD CATEGORY: 2 BENIGN FINDINGS.



RECOMMENDATIONS:

FOLLOW UP: Routine digital bilateral  mammographic screening, one

year interval from  November 2019.



Written communication explaining the IMPRESSION and follow-up,

will be mailed to the patient and referring health care provider.

 



According to the [employer], yearly mammograms

are recommended starting at age 40 and continuing as long as a

woman is in good health.  Any breast change noted on a breast

self-exam should be reported promptly to the patient's healthcare

provider.  Breast MRI is recommended for women with an

approximately 20-25% or greater lifetime risk of breast cancer,

including women with a strong family history of breast or ovarian

cancer and women who have been treated for Hodgkin's disease.



A negative mammographic report should not delay tissue diagnosis

in patients with significant clinical history or physical

findings.



Extremely dense breast tissue limits the sensitivity of digital

mammography. 





Electronically signed by:  Boni Velasquez MD  11/21/2019 8:59 PM

[facility] Workstation: [id number]

## 2020-07-10 ENCOUNTER — HOSPITAL ENCOUNTER (EMERGENCY)
Dept: HOSPITAL 39 - ER | Age: 40
Discharge: HOME | End: 2020-07-10
Payer: COMMERCIAL

## 2020-07-10 VITALS — DIASTOLIC BLOOD PRESSURE: 100 MMHG | SYSTOLIC BLOOD PRESSURE: 160 MMHG

## 2020-07-10 VITALS — OXYGEN SATURATION: 98 % | TEMPERATURE: 97.9 F

## 2020-07-10 DIAGNOSIS — J45.909: ICD-10-CM

## 2020-07-10 DIAGNOSIS — N61.0: Primary | ICD-10-CM

## 2020-07-10 DIAGNOSIS — Z87.891: ICD-10-CM

## 2020-07-10 DIAGNOSIS — K04.7: ICD-10-CM

## 2020-07-10 DIAGNOSIS — S02.5XXA: ICD-10-CM

## 2020-07-10 DIAGNOSIS — F32.9: ICD-10-CM

## 2020-07-10 DIAGNOSIS — K02.9: ICD-10-CM
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
## 2020-07-10 NOTE — ED.PDOC
History of Present Illness





- General


Chief Complaint: General


Stated Complaint: TOOTH ACHE AND LEFT BREAST TENDERNESS


Time Seen by Provider: 07/10/20 07:16


Source: patient


Exam Limitations: no limitations





- History of Present Illness


Initial Comments: 





The patient is a 39-year-old  female presented emergency room secondary

to pain in the left lower molar where a tooth is fractured as well as pain in 

the left breast, just behind the nipple at approximately 3:00.  The patient has 

had a mild mastitis there in the past.  Symptoms were both present for 3 days.  

No obvious abscess formation at either site at this point.  No evidence of 

sepsis.  Is hurting.  The patient does have court general.  The fracture quite a

while.  No fever.


Timing/Duration: other - 3 to 4 days


Severity: moderate


Improving Factors: nothing


Worsening Factors: eating


Associated Symptoms: denies symptoms


Allergies/Adverse Reactions: 


Allergies





Coconut Fatty Acids Allergy (Verified 19 18:13)


   Anaphylaxis


   Causes throat and tongue to swell 


Bee Venom Adverse Reaction (Verified 19 17:26)


   





Home Medications: 


Ambulatory Orders





Albuterol Inhaler [Ventolin Hfa Inhaler] 108 mcg IN Q4HR PRN #1 inh 17 


Cephalexin Monohydrate [Keflex] 500 mg PO Q8H #30 cap 19 


Acetaminophen-Caff-Butalbital [Fioricet] 1 ea PO Q8H PRN #21 tab 07/10/20 


Clindamycin HCl 300 mg PO Q8HR #30 cap 07/10/20 











Review of Systems





- Review of Systems


Constitutional: States: no symptoms reported


EENTM: States: see HPI


Respiratory: States: no symptoms reported


Cardiology: States: see HPI


Gastrointestinal/Abdominal: States: no symptoms reported


Genitourinary: States: no symptoms reported


Musculoskeletal: States: no symptoms reported


Skin: States: see HPI


Neurological: States: no symptoms reported


Endocrine: States: no symptoms reported


All other Systems: No Change from Baseline





Past Medical History (General)





- Patient Medical History


Hx Seizures: No


Hx Stroke: No


Hx Dementia: No


Hx Asthma: Yes


Hx of COPD: No


Hx Cardiac Disorders: No


Hx Congestive Heart Failure: No


Hx Pacemaker: No


Hx Hypertension: No


Hx Thyroid Disease: No


Hx Diabetes: No


Hx Gastroesophageal Reflux: No


Hx Renal Disease: No


Hx Cancer: No


Hx of HIV: No


Hx Hepatitis C: No


Hx MRSA: Yes


MRSA Source:: Skin


Surgical History: other





- Vaccination History


Hx Tetanus, Diphtheria Vaccination: Yes


Hx Influenza Vaccination: No


Hx Pneumococcal Vaccination: No





- Social History


Hx Tobacco Use: Yes


Hx Chewing Tobacco Use: No


Hx Alcohol Use: Yes


Hx Substance Use: No


Hx Substance Use Treatment: No


Hx Depression: Yes


Feels Threatened In Home Enviroment: No


Feels Threatened In a Relationship: No


Hx Physical Abuse: No


Hx Emotional Abuse: No


Hx Suspected Abuse: No





- Female History


Patient is a Female of Child Bearing Age (10 -59 yrs old): Yes


Hx Last Menstrual Period: 14


Patient Pregnant: No





- Triage Comment


ED Triage Comment: The patient was alert and oriented times 4 and complained of 

pain in her left lower jaw from a broken tooth. She also had noted pain in her 

left breast and complained of swelling of the breast with drainage from the 

nipple.





Family Medical History





- Family History


  ** Mother


Family History: Unknown


Living Status: 


Hx Family Hypertension: Yes


Hx Family Stroke: Yes


Hx Family Diabetes: Yes


Hx Family Cancer: Yes - lung,breast-grandma





Physical Exam





- Physical Exam


General Appearance: Agitated, Alert, Comfortable, No apparent distress


Eye Exam: bilateral normal


Ears, Nose, Throat: hearing grossly normal, other - Poor dentition.  No obvious 

abscess formation.


Neck: full range of motion, supple


Respiratory: no respiratory distress, no accessory muscle use


Cardiovascular/Chest: normal peripheral pulses, no edema


Peripheral Pulses: radial,right: 2+, radial,left: 2+


Gastrointestinal/Abdominal: non tender - Obese, soft


Rectal Exam: deferred


Back Exam: no CVA tenderness


Extremity: normal range of motion, no pedal edema, normal capillary refill


Neurologic: CNs II-XII nml as tested, alert, normal mood/affect, oriented x 3


Skin Exam: other - See history of present illness.  No significant erythema of 

the left breast.  No drainage.


Comments: 





                               Vital Signs - 24 hr











  07/10/20





  06:43


 


Temperature 97.9 F


 


Pulse Rate [ 76





Pulse Ox] 


 


Respiratory 18





Rate 


 


Blood Pressure 171/102





[Left Arm] 


 


O2 Sat by Pulse 98





Oximetry 














Progress





- Progress


Progress: 





07/10/20 07:29


The patient is a 39-year-old  female presented emergency room secondary

to a infected dental fracture site.  The patient does need to see a dentist.  

She also has what appears to be a very early mastitis on the left.  No evidence 

of any abscess formation to drain at this point.  She does not use warm 

compresses.  Patient has had biopsies at the site of the past showing apparently

no concerning pathology according to her.  The patiently placed on clindamycin 

for both problems above as well as a small amount of Fioricet for pain control. 

She does need to continue to take oral anti-inflammatory such as Motrin or Aleve

to help reduce pain as well.  Keep well-hydrated.  Take medications with food to

prevent stomach upset.  Keep follow-up with primary care doctor.  ER warnings 

are given.





[email or web address] [email or web address] [phone]








Departure





- Departure


Clinical Impression: 


 Mastitis, Infected dental caries





Disposition: Discharge to Home or Self Care


Condition: Fair


Departure Forms:  ED Discharge - Pt. Copy, Patient Portal Self Enrollment


Instructions:  Tooth Decay, Adult, Mastitis (DC)


Diet: regular diet


Activity: increase activity as tolerated


Referrals: 


Kim Rahman FNP [Primary Care Provider] - 1-2 Weeks


Prescriptions: 


Clindamycin HCl 300 mg PO Q8HR #30 cap


Acetaminophen-Caff-Butalbital [Fioricet] 1 ea PO Q8H PRN #21 tab


 PRN Reason: Pain


Home Medications: 


Ambulatory Orders





Albuterol Inhaler [Ventolin Hfa Inhaler] 108 mcg IN Q4HR PRN #1 inh 17 


Cephalexin Monohydrate [Keflex] 500 mg PO Q8H #30 cap 19 


Acetaminophen-Caff-Butalbital [Fioricet] 1 ea PO Q8H PRN #21 tab 07/10/20 


Clindamycin HCl 300 mg PO Q8HR #30 cap 07/10/20 








Additional Instructions: 


The patient is a 39-year-old  female presented emergency room secondary

to a infected dental fracture site.  The patient does need to see a dentist.  

She also has what appears to be a very early mastitis on the left.  No evidence 

of any abscess formation to drain at this point.  She does not use warm 

compresses.  Patient has had biopsies at the site of the past showing apparently

no concerning pathology according to her.  The patiently placed on clindamycin 

for both problems above as well as a small amount of Fioricet for pain control. 

She does need to continue to take oral anti-inflammatory such as Motrin or Aleve

to help reduce pain as well.  Keep well-hydrated.  Take medications with food to

prevent stomach upset.  Keep follow-up with primary care doctor.  ER warnings 

are given.

## 2020-07-21 ENCOUNTER — HOSPITAL ENCOUNTER (OUTPATIENT)
Dept: HOSPITAL 39 - US | Age: 40
End: 2020-07-21
Attending: FAMILY MEDICINE
Payer: COMMERCIAL

## 2020-07-21 DIAGNOSIS — N61.0: Primary | ICD-10-CM

## 2020-07-22 NOTE — US
EXAM DESCRIPTION: 

Breast,Left: Ultrasound



CLINICAL HISTORY: 

39 yearsFemalePAIN OF LEFT BREAST region of redness and firmness

is probable medial to the left nipple. Patient is taking

antibiotics and abnormal area is shrinking. Remote family history

of breast cancer. Menarche age 10. Childbirth age 19.

Premenopausal. Prior left breast biopsy. No HRT. Lifetime risk of

developing breast cancer (Tyrer-Cuzick model)(%):  10.6.



COMPARISON: 

Bilateral diagnostic digital breast tomosynthesis November 2019.



TECHNIQUE: 

Transcutaneous scanning of the left breast utilizing gray-scale

and Doppler modes. Scanning performed by the sonographer ;

observation by Dr. Velasquez.



FINDINGS: 

The breast parenchymal density pattern is: Heterogeneously dense

breast tissue, which may obscure small masses. 



Ultrasound: Skin thickening overlying the region of erythema

medial to the left breast. This tissue is hypoechoic and vascular

measuring approximately 2.4 x 0.7 cm. No definite fluid

collection.  Typical appearance of the retroareolar tissues.

Small ducts are seen. No dominant solid mass or distinct cyst. No

large calcifications.



IMPRESSION: 

Cellulitis.



BI-RADS CATEGORY: 3 - PROBABLY BENIGN.



RECOMMENDATIONS:

FOLLOW-UP: Short interval (6-month) follow-up or continued

surveillance.  The region of interest should be followed on

clinical grounds, and if noted to change in size or character, a

targeted/directed follow-up on US examination may be performed.



The FINDINGS and the FOLLOW-UP plan were reviewed in person with

the patient after the examination. Written communication

explaining the IMPRESSION and FOLLOW-UP will be mailed to the

patient and referring care provider



Electronically signed by:  Boni Velasquez MD  7/22/2020 4:16 PM CDT

Workstation: [id number]

## 2020-10-01 ENCOUNTER — HOSPITAL ENCOUNTER (OUTPATIENT)
Dept: HOSPITAL 39 - YCFC.O | Age: 40
End: 2020-10-01
Attending: NURSE PRACTITIONER
Payer: COMMERCIAL

## 2020-10-01 DIAGNOSIS — F52.0: Primary | ICD-10-CM

## 2020-10-01 DIAGNOSIS — M25.579: ICD-10-CM

## 2021-02-05 ENCOUNTER — HOSPITAL ENCOUNTER (EMERGENCY)
Dept: HOSPITAL 39 - ER | Age: 41
Discharge: HOME | End: 2021-02-05
Payer: SELF-PAY

## 2021-02-05 VITALS — DIASTOLIC BLOOD PRESSURE: 81 MMHG | TEMPERATURE: 98.1 F | SYSTOLIC BLOOD PRESSURE: 129 MMHG

## 2021-02-05 VITALS — OXYGEN SATURATION: 97 %

## 2021-02-05 DIAGNOSIS — F32.9: ICD-10-CM

## 2021-02-05 DIAGNOSIS — Z87.891: ICD-10-CM

## 2021-02-05 DIAGNOSIS — J45.909: ICD-10-CM

## 2021-02-05 DIAGNOSIS — J02.0: Primary | ICD-10-CM

## 2021-02-05 DIAGNOSIS — Z20.822: ICD-10-CM

## 2021-02-05 NOTE — ED.PDOC
History of Present Illness





- General


Time Seen by Provider: 21 15:52


Source: patient - Sore throat and cough


Exam Limitations: no limitations





- History of Present Illness


Initial Comments: 





Patient complains of sore throat runny nose cough fever,, Chills and malaise 

since yesterday.  Swallowing is painful but not difficult.  She has a cough that

is worse than her usual chronic cough.  She denies dyspnea.  Patient complains 

of nausea without vomiting or diarrhea.  She reports frequent Covid exposure 

while working as a .


Allergies/Adverse Reactions: 


Allergies





Coconut Fatty Acids Allergy (Verified 21 15:47)


   Anaphylaxis


   Causes throat and tongue to swell 


Bee Venom Adverse Reaction (Verified 21 15:47)


   





Home Medications: 


Ambulatory Orders





Acetaminophen [Tylenol] 650 mg PO Q6H PRN #30 tab 20 


Ibuprofen 600 mg PO Q6H PRN #20 tab 20 


Amoxicillin & Pot Clavulanate [Augmentin Tab] 875 mg PO BID 10 Days #20 tab 

21 











Review of Systems





- Review of Systems


Constitutional: States: chills, fever, malaise


EENTM: States: nose congestion, throat pain


Respiratory: States: cough


Cardiology: States: no symptoms reported


Gastrointestinal/Abdominal: States: nausea


Genitourinary: States: no symptoms reported


Musculoskeletal: States: muscle pain


Skin: States: no symptoms reported


Neurological: States: no symptoms reported


Endocrine: States: no symptoms reported


Hematologic/Lymphatic: States: no symptoms reported





Past Medical History (General)





- Patient Medical History


Hx Seizures: No


Hx Stroke: No


Hx Dementia: No


Hx Asthma: Yes


Hx of COPD: No


Hx Cardiac Disorders: No


Hx Congestive Heart Failure: No


Hx Pacemaker: No


Hx Hypertension: No


Hx Thyroid Disease: No


Hx Diabetes: No


Hx Gastroesophageal Reflux: No


Hx Renal Disease: No


Hx Cancer: No


Hx of HIV: No


Hx Hepatitis C: No


Hx MRSA: Yes


MRSA Source:: Skin





- Vaccination History


Hx Tetanus, Diphtheria Vaccination: Yes


Hx Influenza Vaccination: No


Hx Pneumococcal Vaccination: No





- Social History


Hx Tobacco Use: Yes


Hx Chewing Tobacco Use: No


Hx Alcohol Use: Yes


Hx Substance Use: No


Hx Substance Use Treatment: No


Hx Depression: Yes


Hx Physical Abuse: No


Hx Emotional Abuse: No


Hx Suspected Abuse: No





- Female History


Hx Last Menstrual Period: 14


Patient Pregnant: No





Family Medical History





- Family History


  ** Mother


Family History: Unknown


Living Status: 


Hx Family Hypertension: Yes


Hx Family Stroke: Yes


Hx Family Diabetes: Yes


Hx Family Cancer: Yes - lung,breast-grandma





Physical Exam





- Physical Exam


General Appearance: Alert, Comfortable


Eye Exam: bilateral normal


Ears, Nose, Throat: hearing grossly normal, pharyngeal erythema - Minimal, no 

swelling or exudate, other - Nose clear.  Tympanic membranes normal bilaterally.


Neck: full range of motion, supple


Respiratory: chest non-tender, normal breath sounds


Cardiovascular/Chest: normal peripheral pulses, regular rate, rhythm


Gastrointestinal/Abdominal: normal bowel sounds, non tender, soft


Back Exam: normal inspection, no CVA tenderness


Extremity: normal range of motion, non-tender, no pedal edema, no calf 

tenderness


Neurologic: CNs II-XII nml as tested, no motor/sensory deficits, alert, normal 

mood/affect, oriented x 3


Skin Exam: normal color


Lymphatic: no adenopathy





Progress





- Progress


Progress: 





21 16:52Patient declined intramuscular penicillin for her strep throat. 





- Results/Orders


Results/Orders: 





Rapid strep screen positive, rapid COVID-19 nasopharyngeal swab negative.





Departure





- Departure


Clinical Impression: 


 Strep throat





Time of Disposition: 16:53


Disposition: Discharge to Home or Self Care


Condition: Good


Instructions:  Sore Throat, Adult (DC)


Referrals: 


Kim Rahman FNP [Primary Care Provider] - 1-2 Weeks


Prescriptions: 


Amoxicillin & Pot Clavulanate [Augmentin Tab] 875 mg PO BID 10 Days #20 tab


Home Medications: 


Ambulatory Orders





Acetaminophen [Tylenol] 650 mg PO Q6H PRN #30 tab 20 


Ibuprofen 600 mg PO Q6H PRN #20 tab 20 


Amoxicillin & Pot Clavulanate [Augmentin Tab] 875 mg PO BID 10 Days #20 tab 

21 








Additional Instructions: 


No work today And until you are free of all symptoms. Try over-the-counter 

Chloraseptic spray for your throat pain.  Return if you are unable to swallow.  

It is still possible that you could have COVID-19 with a falsely negative test. 

Do not work if you are feeling ill.